# Patient Record
Sex: MALE | URBAN - METROPOLITAN AREA
[De-identification: names, ages, dates, MRNs, and addresses within clinical notes are randomized per-mention and may not be internally consistent; named-entity substitution may affect disease eponyms.]

---

## 2022-06-10 ENCOUNTER — APPOINTMENT (OUTPATIENT)
Dept: URBAN - METROPOLITAN AREA CLINIC 259 | Age: 32
Setting detail: DERMATOLOGY
End: 2022-06-15

## 2022-06-10 VITALS — HEIGHT: 72 IN | RESPIRATION RATE: 14 BRPM | WEIGHT: 220 LBS

## 2022-06-10 DIAGNOSIS — B07.8 OTHER VIRAL WARTS: ICD-10-CM

## 2022-06-10 PROCEDURE — OTHER LIQUID NITROGEN: OTHER

## 2022-06-10 PROCEDURE — OTHER COUNSELING: OTHER

## 2022-06-10 PROCEDURE — 17110 DESTRUCT B9 LESION 1-14: CPT | Mod: 52

## 2022-06-10 PROCEDURE — OTHER CANTHARIDIN: OTHER

## 2022-06-10 PROCEDURE — OTHER MIPS QUALITY: OTHER

## 2022-06-10 ASSESSMENT — LOCATION DETAILED DESCRIPTION DERM: LOCATION DETAILED: LEFT PROXIMAL PALMAR RING FINGER

## 2022-06-10 ASSESSMENT — LOCATION SIMPLE DESCRIPTION DERM: LOCATION SIMPLE: LEFT RING FINGER

## 2022-06-10 ASSESSMENT — LOCATION ZONE DERM: LOCATION ZONE: FINGER

## 2022-06-10 NOTE — PROCEDURE: COUNSELING
Detail Level: Detailed
Patient Specific Counseling (Will Not Stick From Patient To Patient): - Discussed shave removal vs ln2 vs canthacur vs yeast injection vs bleomycin vs starr.

## 2022-06-10 NOTE — PROCEDURE: CANTHARIDIN
Strength: Roddy
Consent: The patient's consent was obtained including but not limited to risks of crusting, scabbing, scarring, blistering, darker or lighter pigmentary change, recurrence, incomplete removal and infection.
Curette Text: Prior to application of cantharidin the lesions were lightly pared with a curette.
Canthacur Ps Duration Text (Please Remove Duration From Postcare): The patient was instructed to leave the Canthacur PS on for 6-8 hours and then wash the area well with soap and water.
Medical Necessity Clause: This procedure was medically necessary because the lesions that were treated were:
Detail Level: Detailed
Include Z78.9 (Other Specified Conditions Influencing Health Status) As An Associated Diagnosis?: Yes
Curette Before Application?: No
Cantharone Plus Duration Text (Please Remove Duration From Postcare): The patient was instructed to leave the Cantharone Plus on for 6-8 hours and then wash the area well with soap and water.
Medical Necessity Information: It is in your best interest to select a reason for this procedure from the list below. All of these items fulfill various CMS LCD requirements except the new and changing color options.
Canthacur Duration Text (Please Remove Duration From Postcare): The patient was instructed to leave the Canthacur on for 6-8 hours and then wash the area well with soap and water.
Cantharone Duration Text (Please Remove Duration From Postcare): The patient was instructed to leave the Cantharone on for 3-4 hours and then wash the area well with soap and water.
Post-Care Instructions: I reviewed with the patient in detail post-care instructions. The patient understands that the treated areas should be washed off 6 to 8 hours after application.
Cantharone Forte Duration Text (Please Remove Duration From Postcare): The patient was instructed to leave the Cantharone Forte on for 6-8 hours and then wash the area well with soap and water.

## 2022-06-24 ENCOUNTER — APPOINTMENT (OUTPATIENT)
Dept: URBAN - METROPOLITAN AREA CLINIC 259 | Age: 32
Setting detail: DERMATOLOGY
End: 2022-06-25

## 2022-06-24 VITALS — WEIGHT: 220 LBS | RESPIRATION RATE: 14 BRPM | HEIGHT: 73 IN

## 2022-06-24 DIAGNOSIS — B07.8 OTHER VIRAL WARTS: ICD-10-CM

## 2022-06-24 PROCEDURE — 17110 DESTRUCT B9 LESION 1-14: CPT

## 2022-06-24 PROCEDURE — OTHER MIPS QUALITY: OTHER

## 2022-06-24 PROCEDURE — OTHER BENIGN DESTRUCTION: OTHER

## 2022-06-24 PROCEDURE — OTHER COUNSELING: OTHER

## 2022-06-24 ASSESSMENT — LOCATION ZONE DERM: LOCATION ZONE: FINGER

## 2022-06-24 ASSESSMENT — LOCATION SIMPLE DESCRIPTION DERM: LOCATION SIMPLE: LEFT MIDDLE FINGER

## 2022-06-24 ASSESSMENT — LOCATION DETAILED DESCRIPTION DERM: LOCATION DETAILED: LEFT PROXIMAL PALMAR MIDDLE FINGER

## 2022-06-24 NOTE — PROCEDURE: BENIGN DESTRUCTION
Anesthesia Volume In Cc: 0.1
Post-Care Instructions: I reviewed with the patient in detail post-care instructions. Patient is to wear sunprotection, and avoid picking at any of the treated lesions. Pt may apply Vaseline to crusted or scabbing areas.
Detail Level: Detailed
Treatment Number (Will Not Render If 0): 2
Render Note In Bullet Format When Appropriate: No
Medical Necessity Clause: This procedure was medically necessary because the lesions that were treated were:
Medical Necessity Information: It is in your best interest to select a reason for this procedure from the list below. All of these items fulfill various CMS LCD requirements except the new and changing color options.
Consent: The patient's consent was obtained including but not limited to risks of crusting, scabbing, blistering, scarring, darker or lighter pigmentary change, recurrence, incomplete removal and infection.

## 2022-07-13 ENCOUNTER — APPOINTMENT (OUTPATIENT)
Dept: URBAN - METROPOLITAN AREA CLINIC 259 | Age: 32
Setting detail: DERMATOLOGY
End: 2022-07-14

## 2022-07-13 VITALS — WEIGHT: 220 LBS | HEIGHT: 71 IN

## 2022-07-13 DIAGNOSIS — B07.8 OTHER VIRAL WARTS: ICD-10-CM

## 2022-07-13 PROCEDURE — OTHER BENIGN DESTRUCTION: OTHER

## 2022-07-13 PROCEDURE — OTHER MIPS QUALITY: OTHER

## 2022-07-13 PROCEDURE — 17110 DESTRUCT B9 LESION 1-14: CPT

## 2022-07-13 PROCEDURE — OTHER COUNSELING: OTHER

## 2022-07-13 ASSESSMENT — LOCATION DETAILED DESCRIPTION DERM: LOCATION DETAILED: LEFT PROXIMAL PALMAR MIDDLE FINGER

## 2022-07-13 ASSESSMENT — LOCATION SIMPLE DESCRIPTION DERM: LOCATION SIMPLE: LEFT MIDDLE FINGER

## 2022-07-13 ASSESSMENT — LOCATION ZONE DERM: LOCATION ZONE: FINGER

## 2022-07-13 NOTE — PROCEDURE: COUNSELING
Patient Specific Counseling (Will Not Stick From Patient To Patient): -Third treatment to wart on finger.\\n-Discussed with pt that if wart still persist then there is a treatment called Zabala.\\n-Pt understood treatment and had no further questions nor concerns.
Detail Level: Detailed

## 2022-07-13 NOTE — PROCEDURE: BENIGN DESTRUCTION
Detail Level: Detailed
Render Note In Bullet Format When Appropriate: No
Medical Necessity Information: It is in your best interest to select a reason for this procedure from the list below. All of these items fulfill various CMS LCD requirements except the new and changing color options.
Consent: The patient's consent was obtained including but not limited to risks of crusting, scabbing, blistering, scarring, darker or lighter pigmentary change, recurrence, incomplete removal and infection.
Render Post-Care Instructions In Note?: yes
Treatment Number (Will Not Render If 0): 3
Post-Care Instructions: I reviewed with the patient in detail post-care instructions. Patient is to wear sunprotection, and avoid picking at any of the treated lesions. Pt may apply Vaseline to crusted or scabbing areas.
Medical Necessity Clause: This procedure was medically necessary because the lesions that were treated were:

## 2022-08-24 ENCOUNTER — APPOINTMENT (OUTPATIENT)
Dept: URBAN - METROPOLITAN AREA CLINIC 259 | Age: 32
Setting detail: DERMATOLOGY
End: 2022-08-24

## 2022-08-24 DIAGNOSIS — B07.8 OTHER VIRAL WARTS: ICD-10-CM

## 2022-08-24 PROCEDURE — OTHER IMMUNOTHERAPY: CANDIDA ANTIGEN: OTHER

## 2022-08-24 PROCEDURE — 11900 INJECT SKIN LESIONS </W 7: CPT

## 2022-08-24 PROCEDURE — OTHER MIPS QUALITY: OTHER

## 2022-08-24 PROCEDURE — OTHER ADDITIONAL NOTES: OTHER

## 2022-08-24 PROCEDURE — OTHER COUNSELING: OTHER

## 2022-08-24 ASSESSMENT — LOCATION ZONE DERM: LOCATION ZONE: FINGER

## 2022-08-24 ASSESSMENT — LOCATION DETAILED DESCRIPTION DERM: LOCATION DETAILED: LEFT PROXIMAL PALMAR MIDDLE FINGER

## 2022-08-24 ASSESSMENT — LOCATION SIMPLE DESCRIPTION DERM: LOCATION SIMPLE: LEFT MIDDLE FINGER

## 2022-08-24 NOTE — PROCEDURE: IMMUNOTHERAPY: CANDIDA ANTIGEN
Detail Level: Detailed
Total Amount Injected In Ccs: 0.1
Treatment #: 1
Post-Care Instructions: I reviewed with the patient in detail post-care instructions. Mild to moderate itching, tenderness, or swelling at the injection site is common and usually lasts 24 to 48 hours. The patient may elevate the painful area, apply ice for 5 minutes at a time, take tylenol every 4 to 6 hours. Only 5% of Candida immunotherapy patients get flu-like symptoms. This usually lasts only 24 to 48 hours. It is possible to prevent this at future visits by taking tylenol before the injection. If warts are on the fingers, all rings should be removed for 2 days post treatment. The patient understands that multiple treatments may be needed and there is no gaurantee of improvement.
Consent was obtained from the patient. The risks of candida antigen injection were discussed in detail. Specifically, the risks of redness, itching, pain and allergic reactions were addressed. Prior to injection all of the patient's questions were answered.
Bill ?: Yes

## 2022-08-24 NOTE — PROCEDURE: MIPS QUALITY
Quality 431: Preventive Care And Screening: Unhealthy Alcohol Use - Screening: Patient not identified as an unhealthy alcohol user when screened for unhealthy alcohol use using a systematic screening method
Quality 402: Tobacco Use And Help With Quitting Among Adolescents: Patient screened for tobacco and never smoked
Detail Level: Detailed
Quality 110: Preventive Care And Screening: Influenza Immunization: Influenza Immunization previously received during influenza season

## 2022-09-14 ENCOUNTER — APPOINTMENT (OUTPATIENT)
Dept: URBAN - METROPOLITAN AREA CLINIC 259 | Age: 32
Setting detail: DERMATOLOGY
End: 2022-09-16

## 2022-09-14 DIAGNOSIS — B07.8 OTHER VIRAL WARTS: ICD-10-CM

## 2022-09-14 PROCEDURE — 11900 INJECT SKIN LESIONS </W 7: CPT

## 2022-09-14 PROCEDURE — OTHER COUNSELING: OTHER

## 2022-09-14 PROCEDURE — OTHER IMMUNOTHERAPY: CANDIDA ANTIGEN: OTHER

## 2022-09-14 PROCEDURE — OTHER MIPS QUALITY: OTHER

## 2022-09-14 PROCEDURE — OTHER ADDITIONAL NOTES: OTHER

## 2022-09-14 ASSESSMENT — LOCATION SIMPLE DESCRIPTION DERM: LOCATION SIMPLE: LEFT MIDDLE FINGER

## 2022-09-14 ASSESSMENT — LOCATION ZONE DERM: LOCATION ZONE: FINGER

## 2022-09-14 ASSESSMENT — LOCATION DETAILED DESCRIPTION DERM: LOCATION DETAILED: LEFT PROXIMAL PALMAR MIDDLE FINGER

## 2022-09-14 NOTE — PROCEDURE: IMMUNOTHERAPY: CANDIDA ANTIGEN
J-Code Units: 1
Render Post-Care Instructions In Note?: yes
Treatment #: 2
Detail Level: Detailed
Total Amount Injected In Ccs: 0.1
Post-Care Instructions: I reviewed with the patient in detail post-care instructions. Mild to moderate itching, tenderness, or swelling at the injection site is common and usually lasts 24 to 48 hours. The patient may elevate the painful area, apply ice for 5 minutes at a time, take tylenol every 4 to 6 hours. Only 5% of Candida immunotherapy patients get flu-like symptoms. This usually lasts only 24 to 48 hours. It is possible to prevent this at future visits by taking tylenol before the injection. If warts are on the fingers, all rings should be removed for 2 days post treatment. The patient understands that multiple treatments may be needed and there is no gaurantee of improvement.
Consent was obtained from the patient. The risks of candida antigen injection were discussed in detail. Specifically, the risks of redness, itching, pain and allergic reactions were addressed. Prior to injection all of the patient's questions were answered.

## 2022-09-14 NOTE — PROCEDURE: COUNSELING
Detail Level: Detailed
Patient Specific Counseling (Will Not Stick From Patient To Patient): -Third treatment to wart on finger.\\n-Discussed with pt that if wart still persist then there is a treatment called Zabala.\\n-Pt understood treatment and had no further questions nor concerns.

## 2022-10-07 ENCOUNTER — APPOINTMENT (OUTPATIENT)
Dept: URBAN - METROPOLITAN AREA CLINIC 259 | Age: 32
Setting detail: DERMATOLOGY
End: 2022-10-09

## 2022-10-07 DIAGNOSIS — B07.8 OTHER VIRAL WARTS: ICD-10-CM

## 2022-10-07 PROCEDURE — OTHER MIPS QUALITY: OTHER

## 2022-10-07 PROCEDURE — 11900 INJECT SKIN LESIONS </W 7: CPT

## 2022-10-07 PROCEDURE — OTHER ADDITIONAL NOTES: OTHER

## 2022-10-07 PROCEDURE — OTHER IMMUNOTHERAPY: CANDIDA ANTIGEN: OTHER

## 2022-10-07 PROCEDURE — OTHER COUNSELING: OTHER

## 2022-10-07 ASSESSMENT — LOCATION DETAILED DESCRIPTION DERM: LOCATION DETAILED: LEFT PROXIMAL PALMAR MIDDLE FINGER

## 2022-10-07 ASSESSMENT — LOCATION ZONE DERM: LOCATION ZONE: FINGER

## 2022-10-07 ASSESSMENT — LOCATION SIMPLE DESCRIPTION DERM: LOCATION SIMPLE: LEFT MIDDLE FINGER

## 2022-10-07 NOTE — PROCEDURE: IMMUNOTHERAPY: CANDIDA ANTIGEN
Expiration Date (Optional): 09/20/2024
Detail Level: Detailed
J-Code Units: 1
Total Amount Injected In Ccs: 0.1
Post-Care Instructions: I reviewed with the patient in detail post-care instructions. Mild to moderate itching, tenderness, or swelling at the injection site is common and usually lasts 24 to 48 hours. The patient may elevate the painful area, apply ice for 5 minutes at a time, take tylenol every 4 to 6 hours. Only 5% of Candida immunotherapy patients get flu-like symptoms. This usually lasts only 24 to 48 hours. It is possible to prevent this at future visits by taking tylenol before the injection. If warts are on the fingers, all rings should be removed for 2 days post treatment. The patient understands that multiple treatments may be needed and there is no gaurantee of improvement.
Lot # (Optional): 
Treatment #: 3
Consent was obtained from the patient. The risks of candida antigen injection were discussed in detail. Specifically, the risks of redness, itching, pain and allergic reactions were addressed. Prior to injection all of the patient's questions were answered.
Bill ?: Yes

## 2022-11-10 ENCOUNTER — APPOINTMENT (OUTPATIENT)
Dept: URBAN - METROPOLITAN AREA CLINIC 254 | Age: 32
Setting detail: DERMATOLOGY
End: 2022-11-12

## 2022-11-10 VITALS — HEIGHT: 72 IN | RESPIRATION RATE: 14 BRPM | WEIGHT: 220 LBS

## 2022-11-10 DIAGNOSIS — B07.8 OTHER VIRAL WARTS: ICD-10-CM

## 2022-11-10 PROCEDURE — OTHER BLEOMYCIN: OTHER

## 2022-11-10 PROCEDURE — OTHER PRESCRIPTION: OTHER

## 2022-11-10 PROCEDURE — OTHER COUNSELING: OTHER

## 2022-11-10 PROCEDURE — 11900 INJECT SKIN LESIONS </W 7: CPT

## 2022-11-10 PROCEDURE — 99212 OFFICE O/P EST SF 10 MIN: CPT | Mod: 25

## 2022-11-10 RX ORDER — FLUOROURACIL 5 MG/G
CREAM TOPICAL
Qty: 40 | Refills: 0 | Status: ERX | COMMUNITY
Start: 2022-11-10

## 2022-11-10 ASSESSMENT — LOCATION SIMPLE DESCRIPTION DERM: LOCATION SIMPLE: RIGHT MIDDLE FINGER

## 2022-11-10 ASSESSMENT — LOCATION ZONE DERM: LOCATION ZONE: FINGER

## 2022-11-10 ASSESSMENT — LOCATION DETAILED DESCRIPTION DERM: LOCATION DETAILED: RIGHT PROXIMAL PALMAR MIDDLE FINGER

## 2022-11-10 NOTE — PROCEDURE: BLEOMYCIN
Pared With?: 15 blade
Total Volume (Ccs): 1
Add 52 Modifier (Optional): no
Bill J-Code: yes
Anesthesia Volume In Cc: 0.5
Post-Care Instructions: I reviewed with the patient in detail post-care instructions. The patient understands that the treated areas should be washed off 6 to 8 hours after application.
Detail Level: Detailed
Anesthesia Type: 1% lidocaine with epinephrine
Method Of Treatment: introduction with a bifurcated needle
Ndc# (Optional): [id number]
Consent: The patient's consent was obtained including but not limited to risks of crusting, scabbing, scarring, blistering, flagellate hyperpigmentation, local vasospasm, darker or lighter pigmentary change, recurrence, incomplete removal and infection.

## 2022-11-12 RX ORDER — FLUOROURACIL 5 MG/G
CREAM TOPICAL
Qty: 40 | Refills: 0 | Status: CANCELLED
Stop reason: SDUPTHER